# Patient Record
Sex: FEMALE | Race: BLACK OR AFRICAN AMERICAN | URBAN - METROPOLITAN AREA
[De-identification: names, ages, dates, MRNs, and addresses within clinical notes are randomized per-mention and may not be internally consistent; named-entity substitution may affect disease eponyms.]

---

## 2017-07-03 ENCOUNTER — APPOINTMENT (OUTPATIENT)
Dept: GENERAL RADIOLOGY | Facility: CLINIC | Age: 52
End: 2017-07-03
Attending: EMERGENCY MEDICINE
Payer: COMMERCIAL

## 2017-07-03 ENCOUNTER — HOSPITAL ENCOUNTER (EMERGENCY)
Facility: CLINIC | Age: 52
Discharge: HOME | End: 2017-07-03
Attending: EMERGENCY MEDICINE | Admitting: EMERGENCY MEDICINE
Payer: COMMERCIAL

## 2017-07-03 VITALS
SYSTOLIC BLOOD PRESSURE: 121 MMHG | HEART RATE: 70 BPM | DIASTOLIC BLOOD PRESSURE: 70 MMHG | OXYGEN SATURATION: 98 % | TEMPERATURE: 98 F | RESPIRATION RATE: 16 BRPM

## 2017-07-03 DIAGNOSIS — S80.00XA CONTUSION OF KNEE, UNSPECIFIED LATERALITY, INITIAL ENCOUNTER: ICD-10-CM

## 2017-07-03 DIAGNOSIS — S50.02XA CONTUSION OF ELBOW, LEFT: ICD-10-CM

## 2017-07-03 DIAGNOSIS — S80.01XA CONTUSION OF RIGHT KNEE, INITIAL ENCOUNTER: ICD-10-CM

## 2017-07-03 DIAGNOSIS — S93.402A SPRAIN OF LEFT ANKLE, UNSPECIFIED LIGAMENT, INITIAL ENCOUNTER: ICD-10-CM
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
## 2017-07-03 DIAGNOSIS — S80.02XA CONTUSION OF LEFT KNEE, INITIAL ENCOUNTER: ICD-10-CM

## 2017-07-03 DIAGNOSIS — S63.502A SPRAIN OF WRIST, LEFT, INITIAL ENCOUNTER: ICD-10-CM

## 2017-07-03 PROCEDURE — 73610 X-RAY EXAM OF ANKLE: CPT | Mod: LT

## 2017-07-03 PROCEDURE — 73080 X-RAY EXAM OF ELBOW: CPT | Mod: LT

## 2017-07-03 PROCEDURE — 73110 X-RAY EXAM OF WRIST: CPT | Mod: LT

## 2017-07-03 PROCEDURE — 73562 X-RAY EXAM OF KNEE 3: CPT | Mod: 50

## 2017-07-03 PROCEDURE — 99284 EMERGENCY DEPT VISIT MOD MDM: CPT | Performed by: EMERGENCY MEDICINE

## 2017-07-03 PROCEDURE — 99284 EMERGENCY DEPT VISIT MOD MDM: CPT | Mod: Z6 | Performed by: EMERGENCY MEDICINE

## 2017-07-03 PROCEDURE — 25000132 ZZH RX MED GY IP 250 OP 250 PS 637: Performed by: EMERGENCY MEDICINE

## 2017-07-03 RX ORDER — NAPROXEN 500 MG/1
500 TABLET ORAL 2 TIMES DAILY WITH MEALS
Qty: 16 TABLET | Refills: 0 | Status: SHIPPED | OUTPATIENT
Start: 2017-07-03 | End: 2017-07-11

## 2017-07-03 RX ORDER — IBUPROFEN 600 MG/1
600 TABLET, FILM COATED ORAL ONCE
Status: COMPLETED | OUTPATIENT
Start: 2017-07-03 | End: 2017-07-03

## 2017-07-03 RX ADMIN — IBUPROFEN 600 MG: 600 TABLET ORAL at 16:29

## 2017-07-03 ASSESSMENT — ENCOUNTER SYMPTOMS
HEADACHES: 0
BACK PAIN: 1
VOMITING: 0
COLOR CHANGE: 0
FLANK PAIN: 0
NECK STIFFNESS: 0
ARTHRALGIAS: 1
NAUSEA: 0
AGITATION: 0
LIGHT-HEADEDNESS: 0
BRUISES/BLEEDS EASILY: 0
POLYDIPSIA: 0
FEVER: 0
ABDOMINAL PAIN: 0
NECK PAIN: 0
WEAKNESS: 0
CHILLS: 0
JOINT SWELLING: 1
SHORTNESS OF BREATH: 0

## 2017-07-03 NOTE — DISCHARGE INSTRUCTIONS
Please make an appointment to follow up with Your Primary Care Provider in 7 days for further evaluation and recommendations. You will need a repeat x-ray of your left wrist in 7 days for further evaluation of a possible fracture in your scaphoid bone. Please keep your splint on at all times until you are able to obtain a repeat x-ray imaging of your left wrist.          *SPRAIN:ANKLE [w/ x-ray]    A sprain is an injury to the ligaments or capsule that holds a joint together. There are no broken bones. Most sprains take from four to six weeks to heal. If the ligament is completely torn (severe sprain), it can take several months to recover.  Mild to Moderate sprains may be treated with an elastic wrap or an in-shoe splint to provide support and prevent re-injury. A mild sprain may not require any additional support. A severe sprain may require surgery to repair, but this is rare.  HOME CARE:  1. Stay off the injured leg as much as possible until you can walk on it without pain. If you have a lot of pain with walking, crutches or a walker may be prescribed. (These can be rented or purchased at many pharmacies and surgical or orthopedic supply stores). Follow your doctor's advice regarding when to begin bearing weight on that leg.  2. Keep your leg elevated to reduce pain and swelling. When sleeping, place a pillow under the injured leg. When sitting, support the injured leg so it is level with your waist. This is very important during the first 48 hours.  3. Apply an ice pack (ice cubes in a plastic bag, wrapped in a towel) over the injured area for 20 minutes every 1-2 hours the first day. You can place the ice pack directly over the splint/cast. If you were given a boot, open it to apply the ice pack. Continue with ice packs 3-4 times a day for the next two days, then as needed for the relief of pain and swelling.  4. You may use acetaminophen (Tylenol) 650-1000 mg every 6 hours or ibuprofen (Motrin, Advil) 600 mg  every 6-8 hours with food to control pain, if you are able to take these medicines. [NOTE: If you have chronic liver or kidney disease or ever had a stomach ulcer or GI bleeding, talk with your doctor before using these medicines.]  5. You may return to sports after healing, when you can run without pain.  6. A sprained ankle is at risk for re-injury during the first six weeks. During that time, protect your ankle with an in-shoe splint that prevents tilting of your ankle from side to side. This is very important if you do active work or play sports during that time.  FOLLOW UP with your doctor, or as advised, if you are not starting to improve within the next five days.   GET PROMPT MEDICAL ATTENTION if any of the following occur:    The plaster cast or splint gets wet or soft    The fiberglass cast or splint gets wet and does not dry for 24 hours    Pain or swelling increases, or redness appears    Toes become cold, blue, numb or tingly    6203-6674 [employer], [address] [place] [address], [place] [address]. All rights reserved. This information is not intended as a substitute for professional medical care. Always follow your healthcare professional's instructions.

## 2017-07-03 NOTE — ED AVS SNAPSHOT
[facility], [place], Emergency Department    [address] [place] [id number]    Phone:  [phone]                                       Danni Mcdonough   MRN: [id number]    Department:  [facility], Emergency Department   Date of Visit:  7/3/2017           After Visit Summary Signature Page     I have received my discharge instructions, and my questions have been answered. I have discussed any challenges I see with this plan with the nurse or doctor.    ..........................................................................................................................................  Patient/Patient Representative Signature      ..........................................................................................................................................  Patient Representative Print Name and Relationship to Patient    ..................................................               ................................................  Date                                            Time    ..........................................................................................................................................  Reviewed by Signature/Title    ...................................................              ..............................................  Date                                                            Time

## 2017-07-03 NOTE — ED NOTES
[place] in [facility]. Both knees, left leg and ankle, arm and wrist are hurting. She was walking and suspect that she tripped on a piece of rubber on the floor. Denies hitting head.

## 2017-07-03 NOTE — ED AVS SNAPSHOT
[facility], Emergency Department    [address] [facility] [address]    Phone:  [phone]                                       Danni Mcdonough   MRN: [id number]    Department:  [facility], Emergency Department   Date of Visit:  7/3/2017           Patient Information     Date Of Birth          1965        Your diagnoses for this visit were:     Sprain of wrist, left, initial encounter     Sprain of left ankle, unspecified ligament, initial encounter     Contusion of knee, unspecified laterality, initial encounter     Contusion of elbow, left        You were seen by Royal Benitez MD.        Discharge Instructions       Please make an appointment to follow up with Your Primary Care Provider in 7 days for further evaluation and recommendations. You will need a repeat x-ray of your left wrist in 7 days for further evaluation of a possible fracture in your scaphoid bone. Please keep your splint on at all times until you are able to obtain a repeat x-ray imaging of your left wrist.          *SPRAIN:ANKLE [w/ x-ray]    A sprain is an injury to the ligaments or capsule that holds a joint together. There are no broken bones. Most sprains take from four to six weeks to heal. If the ligament is completely torn (severe sprain), it can take several months to recover.  Mild to Moderate sprains may be treated with an elastic wrap or an in-shoe splint to provide support and prevent re-injury. A mild sprain may not require any additional support. A severe sprain may require surgery to repair, but this is rare.  HOME CARE:  1. Stay off the injured leg as much as possible until you can walk on it without pain. If you have a lot of pain with walking, crutches or a walker may be prescribed. (These can be rented or purchased at many pharmacies and surgical or orthopedic supply stores). Follow your doctor's advice regarding when to begin bearing weight on that leg.  2. Keep your leg elevated to reduce pain and swelling.  When sleeping, place a pillow under the injured leg. When sitting, support the injured leg so it is level with your waist. This is very important during the first 48 hours.  3. Apply an ice pack (ice cubes in a plastic bag, wrapped in a towel) over the injured area for 20 minutes every 1-2 hours the first day. You can place the ice pack directly over the splint/cast. If you were given a boot, open it to apply the ice pack. Continue with ice packs 3-4 times a day for the next two days, then as needed for the relief of pain and swelling.  4. You may use acetaminophen (Tylenol) 650-1000 mg every 6 hours or ibuprofen (Motrin, Advil) 600 mg every 6-8 hours with food to control pain, if you are able to take these medicines. [NOTE: If you have chronic liver or kidney disease or ever had a stomach ulcer or GI bleeding, talk with your doctor before using these medicines.]  5. You may return to sports after healing, when you can run without pain.  6. A sprained ankle is at risk for re-injury during the first six weeks. During that time, protect your ankle with an in-shoe splint that prevents tilting of your ankle from side to side. This is very important if you do active work or play sports during that time.  FOLLOW UP with your doctor, or as advised, if you are not starting to improve within the next five days.   GET PROMPT MEDICAL ATTENTION if any of the following occur:    The plaster cast or splint gets wet or soft    The fiberglass cast or splint gets wet and does not dry for 24 hours    Pain or swelling increases, or redness appears    Toes become cold, blue, numb or tingly    8285-9107 [employer], [address] [place] [address], [place] [address]. All rights reserved. This information is not intended as a substitute for professional medical care. Always follow your healthcare professional's instructions.          Discharge References/Attachments     WRIST SPRAIN (ENGLISH)    WRIST FRACTURE, POSSIBLE (ENGLISH)     CRUTCHES (WEIGHT-BEARING), DISCHARGE INSTRUCTIONS (ENGLISH)    AIR STIRRUP ANKLE SPLINT (ENGLISH)      24 Hour Appointment Hotline       To make an appointment at any [facility] clinic, call [phone] ([phone]). If you don't have a family doctor or clinic, we will help you find one. [facility] clinics are conveniently located to serve the needs of you and your family.          ED Discharge Orders     Air Stirrup adult           Crutches       Use gait belt during crutch training.            Titan wrist support w/thumb                    Review of your medicines      START taking        Dose / Directions Last dose taken    naproxen 500 MG tablet   Commonly known as:  NAPROSYN   Dose:  500 mg   Quantity:  16 tablet        Take 1 tablet (500 mg) by mouth 2 times daily (with meals) for 8 days   Refills:  0                Prescriptions were sent or printed at these locations (1 Prescription)                   Other Prescriptions                Printed at Department/Unit printer (1 of 1)         naproxen (NAPROSYN) 500 MG tablet                Procedures and tests performed during your visit     Ankle XR, G/E 3 views, left    Elbow  XR, G/E 3 views, left    Knee XR, 3 views, left    Knee XR, 3 views, right    Wrist XR, G/E 3 views, left      Orders Needing Specimen Collection     None      Pending Results     Date and Time Order Name Status Description    7/3/2017 1627 Knee XR, 3 views, right In process     7/3/2017 1627 Knee XR, 3 views, left Preliminary     7/3/2017 1627 Ankle XR, G/E 3 views, left Preliminary     7/3/2017 1627 Elbow  XR, G/E 3 views, left Preliminary     7/3/2017 1627 Wrist XR, G/E 3 views, left Preliminary             Pending Culture Results     No orders found from 7/1/2017 to 7/4/2017.            Pending Results Instructions     If you had any lab results that were not finalized at the time of your Discharge, you can call the ED Lab Result RN at [phone]. You will be contacted by this  "team for any positive Lab results or changes in treatment. The nurses are available 7 days a week from 10A to 6:30P.  You can leave a message 24 hours per day and they will return your call.        Thank you for choosing [facility]       Thank you for choosing [facility] for your care. Our goal is always to provide you with excellent care. Hearing back from our patients is one way we can continue to improve our services. Please take a few minutes to complete the written survey that you may receive in the mail after you visit with us. Thank you!        "[employer] Information     [employer] lets you send messages to your doctor, view your test results, renew your prescriptions, schedule appointments and more. To sign up, go to [email or web address] . Click on \"Log in\" on the left side of the screen, which will take you to the Welcome page. Then click on \"Sign up Now\" on the right side of the page.     You will be asked to enter the access code listed below, as well as some personal information. Please follow the directions to create your username and password.     Your access code is: [email or web address]  Expires: 10/1/2017  6:06 PM     Your access code will  in 90 days. If you need help or a new code, please call your [facility] clinic or [phone].        Care EveryWhere ID     This is your Care EveryWhere ID. This could be used by other organizations to access your [facility] medical records  [id number]        Equal Access to Services     RICH CASANOVA AH: Hadii zay antono Sojennifer, waaxda luqadaha, qaybta kaalmada adepebblesyada, carol aguilera . So [facility] [phone].    ATENCIÓN: Si habla español, tiene a krause disposición servicios gratuitos de asistencia lingüística. Llame al [phone].    We comply with applicable federal civil rights laws and [place] laws. We do not discriminate on the basis of race, color, national origin, age, disability sex, sexual orientation or gender identity.          "   After Visit Summary       This is your record. Keep this with you and show to your community pharmacist(s) and doctor(s) at your next visit.

## 2017-07-03 NOTE — ED PROVIDER NOTES
History     Chief Complaint   Patient presents with     Fall     HPI  Danni Mcdonough is a 52 year old female who presents today with pain in her left arm, left wrist, left leg, and back after having a fall. She states her wrist and knee are the worst pain. Pain is constant, throbbing, non-radiating, moderate, movement makes it worse, rest improves it. Patient reports she was walking a tripped over a rubber mat on the floor. She denies striking her head or any loss of consciousness. She denies neck pain. No fevers. She denies currently taking any medications, including anticoagulant medications.    I have reviewed the Medications, Allergies, Past Medical and Surgical History, and Social History in the Epic system.    Review of Systems   Constitutional: Negative for chills and fever.   HENT: Negative for congestion.    Eyes: Negative for visual disturbance.   Respiratory: Negative for shortness of breath.    Cardiovascular: Negative for chest pain.   Gastrointestinal: Negative for abdominal pain, nausea and vomiting.   Endocrine: Negative for polydipsia and polyuria.   Genitourinary: Negative for flank pain.   Musculoskeletal: Positive for arthralgias, back pain and joint swelling. Negative for gait problem, neck pain and neck stiffness.   Skin: Negative for color change.   Neurological: Negative for weakness, light-headedness and headaches.   Hematological: Does not bruise/bleed easily.   Psychiatric/Behavioral: Negative for agitation and behavioral problems.       Physical Exam   BP: 120/71  Pulse: 80  Temp: 98  F (36.7  C)  Resp: 18  SpO2: 97 %  Physical Exam   Constitutional: She is oriented to person, place, and time. She appears well-developed and well-nourished. No distress.   HENT:   Head: Normocephalic and atraumatic.   Right Ear: External ear normal.   Left Ear: External ear normal.   Nose: Nose normal.   Mouth/Throat: Oropharynx is clear and moist. No oropharyngeal exudate.   Eyes: Conjunctivae and EOM are  normal. Pupils are equal, round, and reactive to light. No scleral icterus.   Neck: Normal range of motion and full passive range of motion without pain. Neck supple. No spinous process tenderness and no muscular tenderness present. No rigidity. No edema, no erythema and normal range of motion present.   Cardiovascular: Normal rate, normal heart sounds and intact distal pulses.    Pulmonary/Chest: Effort normal and breath sounds normal. No respiratory distress. She has no wheezes. She has no rales.   Abdominal: Soft. Bowel sounds are normal. There is no tenderness.   Musculoskeletal: She exhibits edema and tenderness. She exhibits no deformity.   Mild edema over the dorsum of distal left forearm. There is left snuffbox tenderness. There is mild tenderness over the left olecranon.  There is tenderness over anterior knees. There is tenderness over the medial malleolus and left ankle. No midline cervical, thoracic, or lumbar spinous process tenderness. No tenderness over hips.   Lymphadenopathy:     She has no cervical adenopathy.   Neurological: She is alert and oriented to person, place, and time. She has normal strength. No cranial nerve deficit or sensory deficit. She exhibits normal muscle tone. Coordination and gait normal. GCS eye subscore is 4. GCS verbal subscore is 5. GCS motor subscore is 6.   Reflex Scores:       Patellar reflexes are 2+ on the right side and 2+ on the left side.       Achilles reflexes are 2+ on the right side and 2+ on the left side.  Skin: Skin is warm. No rash noted. She is not diaphoretic. No erythema.   Psychiatric: She has a normal mood and affect. Her behavior is normal. Judgment and thought content normal.   Nursing note and vitals reviewed.      ED Course   4:13 PM  The patient was seen and examined by Dr. Benitez in Room ED20.     ED Course     Procedures             Critical Care time:  none               Labs Ordered and Resulted from Time of ED Arrival Up to the Time of  Departure from the ED - No data to display         Assessments & Plan (with Medical Decision Making)   52-year-old woman presenting with left forearm pain, bilateral knee pain and left ankle pain after fall. Differential diagnosis sprain, contusion, fracture, likely dislocation.    After thorough history and physical exam, patient appears to be in no acute distress. I'll treat her pain with oral ibuprofen and obtain x-rays for further diagnostic evaluation. Patient and her daughter agree with the plan.     I reviewed the patient s x-rays and I have read the radiology reports; there is no evidence of acute fracture or dislocation. Given that patient is tender in her left snuffbox area, I will place her in a thumb spica splint. I will also provide her with an Aircast for left ankle sprain as well as crutches and prescription for naproxen. I informed her and her daughter that she will need a repeat x-ray in about 7 days, which can be arranged through her primary care provider, in order to assess for a potential scaphoid fracture in left wrist.  Patient and her daughter agree with the plan. At this point, she is stable for discharge.      This part of the document was transcribed by Tomasz Stephens Medical Scribe.    I have reviewed the nursing notes.    I have reviewed the findings, diagnosis, plan and need for follow up with the patient.    Discharge Medication List as of 7/3/2017  6:07 PM      START taking these medications    Details   naproxen (NAPROSYN) 500 MG tablet Take 1 tablet (500 mg) by mouth 2 times daily (with meals) for 8 days, Disp-16 tablet, R-0, Local Print             Final diagnoses:   Sprain of wrist, left, initial encounter   Sprain of left ankle, unspecified ligament, initial encounter   Contusion of knee, unspecified laterality, initial encounter   Contusion of elbow, left     ISaleem, am serving as a trained medical scribe to document services personally performed by Geronimo Benitez MD,  based on the provider's statements to me.   I, Geronimo Benitez MD, was physically present and have reviewed and verified the accuracy of this note documented by Saleem Neal.    7/3/2017   [facility], [place], EMERGENCY DEPARTMENT     Royal Benitez MD  07/03/17 6043